# Patient Record
Sex: MALE | Race: WHITE | URBAN - METROPOLITAN AREA
[De-identification: names, ages, dates, MRNs, and addresses within clinical notes are randomized per-mention and may not be internally consistent; named-entity substitution may affect disease eponyms.]

---

## 2017-08-17 NOTE — PATIENT DISCUSSION
need report of visit from 2012 at [facility] with Dr Natasha Smith to me to review once his consult report available at [facility].

## 2018-01-17 ENCOUNTER — IMPORTED ENCOUNTER (OUTPATIENT)
Dept: URBAN - METROPOLITAN AREA CLINIC 50 | Facility: CLINIC | Age: 83
End: 2018-01-17

## 2018-02-02 ENCOUNTER — IMPORTED ENCOUNTER (OUTPATIENT)
Dept: URBAN - METROPOLITAN AREA CLINIC 50 | Facility: CLINIC | Age: 83
End: 2018-02-02

## 2018-05-07 NOTE — PATIENT DISCUSSION
need report of visit from 2012 at [facility] with Dr Alexander Joy to me to review once his consult report available at [facility].

## 2018-05-23 NOTE — PATIENT DISCUSSION
need report of visit from 2012 at [place] with Dr Marylin Crook to me to review once his consult report available at [facility].

## 2018-05-23 NOTE — PATIENT DISCUSSION
Order VF 24-2 Gabriela Standard today and is normal.  Sounds to be having a TVO OS.  Has CDU tomorrow.  Could see retina to eval ocular blood flow with FA.  consult Karthikeyan Sanchez to shyam.  Sounds like had a TA ultrasound at hospital and blood work that was negative.  Sounds like [facility] has been on the radar.  I will try to find out from PCP or [place] if ESR and CRP have been ordered.  If not I will order.

## 2018-05-23 NOTE — PATIENT DISCUSSION
I spoke with Dr Kelsi Rasheed at lunch today he looked in his EMR and pt did have ESR done on May 5th and was 28.  I ed I recommend we repeat ESR and also order a CRP just to be extra cautious.  He said he would take care of this and get it ordered.  I will call the patient to confirm.  Dr Kelsi Rasheed also said he does not think a TA ultrasound was done although patient states she had a ultrasound of her temple area done at the [address] [place] [address].

## 2018-05-25 NOTE — PATIENT DISCUSSION
Normal ESR and CRP.  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 Possible order MRA at next visit pending results of Temporal Artery and Ophthalmic ultrasounds. Order given and faxed (Dr. Alyse Whitehead) for temporal artery and ophthalmic ultrasounds.

## 2018-06-05 NOTE — PATIENT DISCUSSION
Pt's states when she blinks her vision clears up a bit. Could be contributing to decreased vision. Recommend starting PF Celluvisc QID. Information given to patient.

## 2018-06-05 NOTE — PATIENT DISCUSSION
All tests came back WNL. TA ruled out. Recommend that patient see Dr. Rachele Phalen for patchy Choroidal Filling and dysphotopsias. Will follow back up with patient in 6 months pending Dr. Joyce Nick findings.

## 2019-03-05 ENCOUNTER — IMPORTED ENCOUNTER (OUTPATIENT)
Dept: URBAN - METROPOLITAN AREA CLINIC 50 | Facility: CLINIC | Age: 84
End: 2019-03-05

## 2019-03-06 ENCOUNTER — IMPORTED ENCOUNTER (OUTPATIENT)
Dept: URBAN - METROPOLITAN AREA CLINIC 50 | Facility: CLINIC | Age: 84
End: 2019-03-06

## 2019-03-21 ENCOUNTER — IMPORTED ENCOUNTER (OUTPATIENT)
Dept: URBAN - METROPOLITAN AREA CLINIC 50 | Facility: CLINIC | Age: 84
End: 2019-03-21

## 2019-03-25 ENCOUNTER — IMPORTED ENCOUNTER (OUTPATIENT)
Dept: URBAN - METROPOLITAN AREA CLINIC 50 | Facility: CLINIC | Age: 84
End: 2019-03-25

## 2019-03-29 ENCOUNTER — IMPORTED ENCOUNTER (OUTPATIENT)
Dept: URBAN - METROPOLITAN AREA CLINIC 50 | Facility: CLINIC | Age: 84
End: 2019-03-29

## 2019-04-03 ENCOUNTER — IMPORTED ENCOUNTER (OUTPATIENT)
Dept: URBAN - METROPOLITAN AREA CLINIC 50 | Facility: CLINIC | Age: 84
End: 2019-04-03

## 2019-04-03 NOTE — PATIENT DISCUSSION
"""S/P IOL OD: Sensar AAB00 20.5 +Omidria. Continue post operative instructions and drops per schedule.  """

## 2019-04-09 ENCOUNTER — IMPORTED ENCOUNTER (OUTPATIENT)
Dept: URBAN - METROPOLITAN AREA CLINIC 50 | Facility: CLINIC | Age: 84
End: 2019-04-09

## 2019-04-23 ENCOUNTER — IMPORTED ENCOUNTER (OUTPATIENT)
Dept: URBAN - METROPOLITAN AREA CLINIC 50 | Facility: CLINIC | Age: 84
End: 2019-04-23

## 2019-08-08 ENCOUNTER — IMPORTED ENCOUNTER (OUTPATIENT)
Dept: URBAN - METROPOLITAN AREA CLINIC 50 | Facility: CLINIC | Age: 84
End: 2019-08-08

## 2021-04-17 ASSESSMENT — TONOMETRY
OS_IOP_MMHG: 15
OD_IOP_MMHG: 15
OD_IOP_MMHG: 15
OD_IOP_MMHG: 18
OS_IOP_MMHG: 14
OD_IOP_MMHG: 15
OD_IOP_MMHG: 15
OD_IOP_MMHG: 14

## 2021-04-17 ASSESSMENT — VISUAL ACUITY
OS_SC: 20/40
OD_CC: J2
OS_SC: 20/50+2
OS_SC: 20/40+2
OS_PH: 20/20
OD_SC: CF @ 4'
OS_OTHER: 20/60. 20/60.
OS_CC: J2
OS_CC: J1+
OS_SC: 20/40-1
OS_BAT: 20/60
OS_BAT: 20/60
OS_OTHER: 20/60. 20/80.
OS_OTHER: 20/60. 20/60.
OS_BAT: 20/60
OD_CC: J1+

## 2021-06-10 NOTE — PATIENT DISCUSSION
All tests came back WNL. TA ruled out. Recommend that patient see Dr. Lawrence Rebolledo for patchy Choroidal Filling and dysphotopsias. Will follow back up with patient in 6 months pending Dr. Luis Alfredo Malagon findings.

## 2022-02-15 NOTE — PATIENT DISCUSSION
All tests came back WNL. TA ruled out. Recommend that patient see Dr. Candelaria Woods for patchy Choroidal Filling and dysphotopsias. Will follow back up with patient in 6 months pending Dr. Kearney Matters findings.

## 2022-02-28 NOTE — PATIENT DISCUSSION
Patient desires YAG Laser Capsulotomy. Spoke w/ patient. Reviewed dosing instructions and date of next testing     Patient had chemo yesterday and was given steroids as well. She wanted to know if that would cause her numbers to go up or down?

## 2022-02-28 NOTE — PATIENT DISCUSSION
All tests came back WNL. TA ruled out. Recommend that patient see Dr. Hallie Burciaga for patchy Choroidal Filling and dysphotopsias. Will follow back up with patient in 6 months pending Dr. Dylon Hall findings.

## 2022-02-28 NOTE — PATIENT DISCUSSION
All tests came back WNL. TA ruled out. Recommend that patient see Dr. Sander Navas for patchy Choroidal Filling and dysphotopsias. Will follow back up with patient in 6 months pending Dr. Hector Ambrose findings.

## 2022-02-28 NOTE — PROCEDURE NOTE: SURGICAL
<p>Prior to commencing surgery patient identification, surgical procedure, site, and side were confirmed by Dr. Galina Richard. Following topical proparacaine anesthesia, the patient was positioned at the YAG laser, a contact lens coupled to the cornea with methylcellulose and an axial posterior capsulotomy performed without complication using 3.7 Mj x 31. Excess methylcellulose was washed from the eye, one drop of Alphagan was instilled and the patient returned to the holding area having tolerated the procedure well and without complication. </p><p>MRN #[id number]</p>

## 2022-03-07 NOTE — PATIENT DISCUSSION
All tests came back WNL. TA ruled out. Recommend that patient see Dr. Toshia Castellano for patchy Choroidal Filling and dysphotopsias. Will follow back up with patient in 6 months pending Dr. Michel Organ findings.

## 2022-03-24 NOTE — PATIENT DISCUSSION
Patient informed on possible need for slab off or separate distance and near glasses due to anisometropia.

## 2024-04-17 NOTE — PATIENT DISCUSSION
Observe. Subjective   Patient ID: Raymond is a 52 year old male.    Chief Complaint   Patient presents with    Office Visit    Establish Care    Follow-up Hypertension    Refill Request     Amlodipine-benazepril 10-20mg     HPI initial visit to establish care also requesting refill for amlodipine benazepril, ran out of medication for 2 weeks.  He was not taking hydrochlorothiazide as per medication list.  Denies any headache, dizziness, chest pain or shortness of breath  Raymond has a past medical history of HTN (hypertension).  Raymond has Uncontrolled hypertension; Encounter for preventive health examination; Stage 2 chronic kidney disease; and Current smoker on some days on their problem list.  Raymond has a past surgical history that includes No past surgeries.  His family history includes Hypertension in his brother, mother, and another family member; Patient is unaware of any medical problems in his father, maternal grandfather, maternal grandmother, paternal grandfather, paternal grandmother, and sister.  Raymond reports that he has been smoking cigarettes. He has never used smokeless tobacco. He reports current alcohol use of about 12.0 standard drinks of alcohol per week. He reports that he does not use drugs.  Raymond has a current medication list which includes the following prescription(s): amlodipine-benazepril.  Raymond   Current Outpatient Medications   Medication Sig Dispense Refill    amlodipine-benazepril (LOTREL) 10-20 MG per capsule Take 1 capsule by mouth daily. 90 capsule 1     No current facility-administered medications for this visit.     Raymond has No Known Allergies.    Review of Systems   Constitutional:  Negative for activity change, appetite change, fatigue, fever and unexpected weight change.   HENT:  Negative for congestion, ear pain, hearing loss, nosebleeds, postnasal drip, sinus pain, sneezing, sore throat, tinnitus, trouble swallowing and voice change.    Eyes:  Negative for pain and visual  disturbance.   Respiratory:  Negative for cough, shortness of breath and wheezing.    Cardiovascular:  Negative for chest pain, palpitations and leg swelling.   Gastrointestinal:  Negative for abdominal pain, blood in stool, constipation, diarrhea, nausea and vomiting.   Endocrine: Negative for cold intolerance, heat intolerance, polydipsia, polyphagia and polyuria.   Genitourinary:  Negative for decreased urine volume, difficulty urinating, dysuria, frequency, hematuria and urgency.   Musculoskeletal:  Negative for arthralgias, gait problem and myalgias.   Skin:  Negative for color change and rash.   Allergic/Immunologic: Negative for environmental allergies and food allergies.   Neurological:  Negative for dizziness, weakness, numbness and headaches.   Hematological:  Negative for adenopathy. Does not bruise/bleed easily.   Psychiatric/Behavioral:  Negative for behavioral problems, dysphoric mood, hallucinations and sleep disturbance. The patient is not nervous/anxious.      Objective   Physical Exam  Vitals:    04/17/24 1553 04/17/24 1607   BP: (!) 159/110 (!) 148/98   BP Location: LUE - Left upper extremity LUE - Left upper extremity   Patient Position: Sitting Sitting   Cuff Size: Regular Large Adult   Pulse: 65    Resp: 16    Temp: 98.1 °F (36.7 °C)    SpO2: 100%    Weight: 92.3 kg (203 lb 6 oz)    Height: 5' 10\" (1.778 m)    PainSc:  0      Nursing note and vitals reviewed  Constitutional:  oriented to person, place, and time.  appears well-developed and well-nourished.   HENT:   Head: Normocephalic and atraumatic.   Ears: External ears normal. No cerumen impaction, tympanic membrane normal  Nose: Nose normal.  No hypertrophy or edema of turbinates.  No sinus tenderness  Mouth/Throat: Oropharynx is clear and moist.   Eyes: EOM are normal. Pupils are equal, round, and reactive to light.   Neck: Neck supple. No JVD present. No tracheal deviation present. No thyromegaly present.   Lungs: Clear with good air  entry, no wheezes or rales.  Cardiovascular: Normal rate, regular rhythm and normal heart sounds. Exam reveals no gallop.   No murmur heard.  Abdomen: Not distended, no tenderness, no mass, bowel sounds normal.  Musculoskeletal: Normal range of motion. no extremity edema.   Neurological:  oriented to person, place, and time.   Speech normal , gait normal  No sensory or motor deficit noted   Skin: Skin is dry. No rash   Psychiatric:has a normal mood and affect.     Assessment   Problem List Items Addressed This Visit          Medium    Stage 2 chronic kidney disease  Avoid nonsteroidal anti-inflammatory medications  Low-sodium diet advised  Better blood pressure control advised  Will repeat lab          Low    Uncontrolled hypertension - Primary  Blood pressure poorly controlled due to noncompliance, advised compliance  Low-sodium diet advised  Avoid nonsteroidal anti-inflammatory medications  Reduce caffeine intake  Regular exercise advised  Monitor blood pressure at home  Continue current medications, refills given  RN visit for BP check in 2 weeks  Follow-up with me in 1 to 2 months  Labs before appointment       Relevant Medications    amlodipine-benazepril (LOTREL) 10-20 MG per capsule    Other Relevant Orders    CAN'T FIND CONSULT  Current smoker on some days  Smokes on weekends only  Smoking cessation counselling done for 6 minutes   Try nicotine patch  ,available over the counter    use of Wellbutrin / Chantix discussed , pt declined , drug info given    Risks of smoking explained including different cancers/ COPD/CAD ,patient verbalized understanding     Other Visit Diagnoses       Relevant Medications    amlodipine-benazepril (LOTREL) 10-20 MG per capsule    Laboratory examination ordered as part of a routine general medical examination        Relevant Orders    CBC with Automated Differential    Comprehensive Metabolic Panel    Glycohemoglobin    Lipid Panel Without Reflex    Thyroid Stimulating Hormone  Reflex    Vitamin D -25 Hydroxy    Microalbumin Urine Random    XR CHEST PA AND LATERAL 2 VIEWS    PSA

## 2024-06-19 NOTE — PATIENT DISCUSSION
June 20, 2024        Tim Self             [facility] 1st Fl  [address]  [facility] [address]  Phone: [phone]   Patient: Vale Beaver   MR Number: [id number]   YOB: 1960   Date of Visit: 6/19/2024       Dear Dr. Tim Linton    Thank you for referring Vale Beaver to me for evaluation. Attached you will find relevant portions of my assessment and plan of care.    If you have questions, please do not hesitate to call me. I look forward to following Vale Beaver along with you.    Sincerely,    Ysabel Duncan, DO    Enclosure    If you would like to receive this communication electronically, please contact [email or web address] or [phone] to request [employer] Link access.    [employer] Link is a tool which provides read-only access to select patient information with whom you have a relationship. Its easy to use and provides real time access to review your patients record including encounter summaries, notes, results, and demographic information.    If you feel you have received this communication in error or would no longer like to receive these types of communications, please e-mail [email or web address]    Observe.